# Patient Record
Sex: MALE | Race: BLACK OR AFRICAN AMERICAN | NOT HISPANIC OR LATINO | RURAL
[De-identification: names, ages, dates, MRNs, and addresses within clinical notes are randomized per-mention and may not be internally consistent; named-entity substitution may affect disease eponyms.]

---

## 2022-04-24 ENCOUNTER — HOSPITAL ENCOUNTER (EMERGENCY)
Facility: HOSPITAL | Age: 72
Discharge: HOME | End: 2022-04-24
Payer: MEDICARE

## 2022-04-24 VITALS
RESPIRATION RATE: 16 BRPM | OXYGEN SATURATION: 96 % | TEMPERATURE: 98 F | DIASTOLIC BLOOD PRESSURE: 109 MMHG | HEART RATE: 69 BPM | SYSTOLIC BLOOD PRESSURE: 181 MMHG | HEIGHT: 74 IN | WEIGHT: 200 LBS | BODY MASS INDEX: 25.67 KG/M2

## 2022-04-24 DIAGNOSIS — T78.40XA ALLERGIC REACTION, INITIAL ENCOUNTER: Primary | ICD-10-CM

## 2022-04-24 DIAGNOSIS — I10 HYPERTENSION, UNSPECIFIED TYPE: ICD-10-CM

## 2022-04-24 PROCEDURE — 63600175 PHARM REV CODE 636 W HCPCS: Performed by: NURSE PRACTITIONER

## 2022-04-24 PROCEDURE — 96375 TX/PRO/DX INJ NEW DRUG ADDON: CPT

## 2022-04-24 PROCEDURE — 99284 EMERGENCY DEPT VISIT MOD MDM: CPT | Mod: 25

## 2022-04-24 PROCEDURE — 99283 EMERGENCY DEPT VISIT LOW MDM: CPT | Mod: GF | Performed by: NURSE PRACTITIONER

## 2022-04-24 PROCEDURE — 96374 THER/PROPH/DIAG INJ IV PUSH: CPT

## 2022-04-24 RX ORDER — METHYLPREDNISOLONE SOD SUCC 125 MG
125 VIAL (EA) INJECTION
Status: COMPLETED | OUTPATIENT
Start: 2022-04-24 | End: 2022-04-24

## 2022-04-24 RX ORDER — DIPHENHYDRAMINE HYDROCHLORIDE 50 MG/ML
25 INJECTION INTRAMUSCULAR; INTRAVENOUS
Status: COMPLETED | OUTPATIENT
Start: 2022-04-24 | End: 2022-04-24

## 2022-04-24 RX ORDER — METHYLPREDNISOLONE 4 MG/1
TABLET ORAL
Qty: 1 EACH | Refills: 0 | Status: SHIPPED | OUTPATIENT
Start: 2022-04-24 | End: 2022-04-24 | Stop reason: SDUPTHER

## 2022-04-24 RX ORDER — METHYLPREDNISOLONE 4 MG/1
TABLET ORAL
Qty: 1 EACH | Refills: 0 | Status: SHIPPED | OUTPATIENT
Start: 2022-04-24 | End: 2022-05-15

## 2022-04-24 RX ADMIN — METHYLPREDNISOLONE SODIUM SUCCINATE 125 MG: 125 INJECTION, POWDER, FOR SOLUTION INTRAMUSCULAR; INTRAVENOUS at 12:04

## 2022-04-24 RX ADMIN — DIPHENHYDRAMINE HYDROCHLORIDE 25 MG: 50 INJECTION, SOLUTION INTRAMUSCULAR; INTRAVENOUS at 12:04

## 2022-04-24 NOTE — DISCHARGE INSTRUCTIONS
Monitor BP twice a day and take to the clinic next week. Take Benadryl at home for swelling. Return to the ED for any increase in symptoms or any worrisome of symptoms.

## 2022-04-27 ENCOUNTER — TELEPHONE (OUTPATIENT)
Dept: EMERGENCY MEDICINE | Facility: HOSPITAL | Age: 72
End: 2022-04-27
Payer: MEDICAID

## 2022-08-30 NOTE — ED PROVIDER NOTES
Encounter Date: 4/24/2022       History     Chief Complaint   Patient presents with    Oral Swelling     Upper lip swelling since this am      70 y/o BF presents to the ED with c/o swelling to his upper lip noted upon waking this morning. Pt reports he woke up around 730, and his lip was already swelling. He reports it has been this swollen for about 5 hours. He denies any difficulty swallowing or drinking. Reports he ate corn flakes this morning. He denies any chest pain or shortness of breath. Pt is currently hypertension        Review of patient's allergies indicates:  No Known Allergies  History reviewed. No pertinent past medical history.  History reviewed. No pertinent surgical history.  Family History   Problem Relation Age of Onset    Hypertension Mother     Hypertension Father     Hypertension Sister     Hypertension Brother      Social History     Tobacco Use    Smoking status: Current Every Day Smoker     Types: Cigarettes    Smokeless tobacco: Never Used   Substance Use Topics    Alcohol use: Yes     Comment: drinks 1 pint a day    Drug use: Yes     Frequency: 1.0 times per week     Types: Marijuana     Comment: every other week     Review of Systems   Constitutional: Negative.    HENT: Negative.         Swelling of upper lip     Eyes: Negative.    Respiratory: Negative.  Negative for shortness of breath.    Cardiovascular: Negative.  Negative for chest pain, palpitations and leg swelling.   Gastrointestinal: Negative.  Negative for diarrhea, nausea and vomiting.   Endocrine: Negative.    Genitourinary: Negative.    Musculoskeletal: Negative.  Negative for arthralgias, back pain and neck pain.   Skin: Negative.    Neurological: Negative.    Psychiatric/Behavioral: Negative.    All other systems reviewed and are negative.      Physical Exam     Initial Vitals [04/24/22 1225]   BP Pulse Resp Temp SpO2   (!) 202/113 72 18 98.1 °F (36.7 °C) 97 %      MAP       --         Physical Exam    Nursing  note and vitals reviewed.  Constitutional: He appears well-developed and well-nourished.   HENT:   Head: Normocephalic and atraumatic.   Mouth/Throat: Uvula is midline, oropharynx is clear and moist and mucous membranes are normal. No uvula swelling.       Eyes: Conjunctivae and EOM are normal. Pupils are equal, round, and reactive to light.   Neck: Neck supple.   Normal range of motion.  Cardiovascular: Normal rate, regular rhythm and normal heart sounds.   Pulmonary/Chest: Breath sounds normal.   Abdominal: Abdomen is soft. Bowel sounds are normal. There is no abdominal tenderness. There is no rebound and no guarding.   Musculoskeletal:         General: Normal range of motion.      Cervical back: Normal range of motion and neck supple.     Neurological: He is alert and oriented to person, place, and time. He has normal strength.   Skin: Skin is warm and dry.   Psychiatric: He has a normal mood and affect.         Medical Screening Exam   See Full Note    ED Course   Procedures  Labs Reviewed - No data to display       Imaging Results    None          Medications   diphenhydrAMINE injection 25 mg (25 mg Intravenous Given 4/24/22 1255)   methylPREDNISolone sodium succinate injection 125 mg (125 mg Intravenous Given 4/24/22 1255)     Medical Decision Making:   ED Management:  Swelling had decreased some. Medrol dosepack sent to [facility]. Pt is to take OTC Bendaryl and follow up with PCP or HTN.                   Clinical Impression:   Final diagnoses:  [T78.40XA] Allergic reaction, initial encounter (Primary)  [I10] Hypertension, unspecified type          ED Disposition Condition    Discharge Stable        ED Prescriptions     Medication Sig Dispense Start Date End Date Auth. Provider    methylPREDNISolone (MEDROL DOSEPACK) 4 mg tablet  (Status: Discontinued) Take as directed 1 each 4/24/2022 4/24/2022 LÓPEZ Broussard    methylPREDNISolone (MEDROL DOSEPACK) 4 mg tablet  (Status: Discontinued) Take as directed 1  each 4/24/2022 4/24/2022 LÓPEZ Broussard    methylPREDNISolone (MEDROL DOSEPACK) 4 mg tablet Take as directed 1 each 4/24/2022 5/15/2022 LÓPEZ Broussard        Follow-up Information    None          LÓPEZ Broussard  04/24/22 3173     yes

## 2025-02-05 ENCOUNTER — OFFICE VISIT (OUTPATIENT)
Dept: FAMILY MEDICINE | Facility: CLINIC | Age: 75
End: 2025-02-05
Payer: MEDICARE

## 2025-02-05 VITALS
DIASTOLIC BLOOD PRESSURE: 75 MMHG | HEIGHT: 74 IN | SYSTOLIC BLOOD PRESSURE: 168 MMHG | BODY MASS INDEX: 25.23 KG/M2 | TEMPERATURE: 98 F | OXYGEN SATURATION: 96 % | HEART RATE: 85 BPM | WEIGHT: 196.63 LBS | RESPIRATION RATE: 18 BRPM

## 2025-02-05 DIAGNOSIS — I10 ESSENTIAL HYPERTENSION: Primary | ICD-10-CM

## 2025-02-05 LAB
ALBUMIN SERPL BCP-MCNC: 3.7 G/DL (ref 3.4–4.8)
ALBUMIN/GLOB SERPL: 0.8 {RATIO}
ALP SERPL-CCNC: 54 U/L (ref 40–150)
ALT SERPL W P-5'-P-CCNC: 14 U/L
ANION GAP SERPL CALCULATED.3IONS-SCNC: 13 MMOL/L (ref 7–16)
AST SERPL W P-5'-P-CCNC: 26 U/L (ref 5–34)
BASOPHILS # BLD AUTO: 0.1 K/UL (ref 0–0.2)
BASOPHILS NFR BLD AUTO: 1.5 % (ref 0–1)
BILIRUB SERPL-MCNC: 0.4 MG/DL
BUN SERPL-MCNC: 21 MG/DL (ref 8–26)
BUN/CREAT SERPL: 18 (ref 6–20)
CALCIUM SERPL-MCNC: 9.6 MG/DL (ref 8.8–10)
CHLORIDE SERPL-SCNC: 108 MMOL/L (ref 98–107)
CHOLEST SERPL-MCNC: 168 MG/DL
CHOLEST/HDLC SERPL: 3.4 {RATIO}
CO2 SERPL-SCNC: 21 MMOL/L (ref 23–31)
CREAT SERPL-MCNC: 1.15 MG/DL (ref 0.72–1.25)
DIFFERENTIAL METHOD BLD: ABNORMAL
EGFR (NO RACE VARIABLE) (RUSH/TITUS): 67 ML/MIN/1.73M2
EOSINOPHIL # BLD AUTO: 0.41 K/UL (ref 0–0.5)
EOSINOPHIL NFR BLD AUTO: 6 % (ref 1–4)
ERYTHROCYTE [DISTWIDTH] IN BLOOD BY AUTOMATED COUNT: 14.4 % (ref 11.5–14.5)
GLOBULIN SER-MCNC: 4.9 G/DL (ref 2–4)
GLUCOSE SERPL-MCNC: 84 MG/DL (ref 82–115)
HCT VFR BLD AUTO: 41.1 % (ref 40–54)
HDLC SERPL-MCNC: 49 MG/DL (ref 35–60)
HGB BLD-MCNC: 13.4 G/DL (ref 13.5–18)
IMM GRANULOCYTES # BLD AUTO: 0.01 K/UL (ref 0–0.04)
IMM GRANULOCYTES NFR BLD: 0.1 % (ref 0–0.4)
LDLC SERPL CALC-MCNC: 107 MG/DL
LDLC/HDLC SERPL: 2.2 {RATIO}
LYMPHOCYTES # BLD AUTO: 2.1 K/UL (ref 1–4.8)
LYMPHOCYTES NFR BLD AUTO: 30.6 % (ref 27–41)
MCH RBC QN AUTO: 30.8 PG (ref 27–31)
MCHC RBC AUTO-ENTMCNC: 32.6 G/DL (ref 32–36)
MCV RBC AUTO: 94.5 FL (ref 80–96)
MONOCYTES # BLD AUTO: 0.58 K/UL (ref 0–0.8)
MONOCYTES NFR BLD AUTO: 8.5 % (ref 2–6)
MPC BLD CALC-MCNC: 10.3 FL (ref 9.4–12.4)
NEUTROPHILS # BLD AUTO: 3.66 K/UL (ref 1.8–7.7)
NEUTROPHILS NFR BLD AUTO: 53.3 % (ref 53–65)
NONHDLC SERPL-MCNC: 119 MG/DL
NRBC # BLD AUTO: 0 X10E3/UL
NRBC, AUTO (.00): 0 %
NT-PROBNP SERPL-MCNC: 175 PG/ML (ref 1–125)
PLATELET # BLD AUTO: 309 K/UL (ref 150–400)
POTASSIUM SERPL-SCNC: 3.8 MMOL/L (ref 3.5–5.1)
PROT SERPL-MCNC: 8.6 G/DL (ref 5.8–7.6)
RBC # BLD AUTO: 4.35 M/UL (ref 4.6–6.2)
SODIUM SERPL-SCNC: 138 MMOL/L (ref 136–145)
TRIGL SERPL-MCNC: 61 MG/DL (ref 34–140)
VLDLC SERPL-MCNC: 12 MG/DL
WBC # BLD AUTO: 6.86 K/UL (ref 4.5–11)

## 2025-02-05 PROCEDURE — 83880 ASSAY OF NATRIURETIC PEPTIDE: CPT | Mod: ,,, | Performed by: CLINICAL MEDICAL LABORATORY

## 2025-02-05 PROCEDURE — 80061 LIPID PANEL: CPT | Mod: ,,, | Performed by: CLINICAL MEDICAL LABORATORY

## 2025-02-05 PROCEDURE — 82570 ASSAY OF URINE CREATININE: CPT | Mod: ,,, | Performed by: CLINICAL MEDICAL LABORATORY

## 2025-02-05 PROCEDURE — 85025 COMPLETE CBC W/AUTO DIFF WBC: CPT | Mod: ,,, | Performed by: CLINICAL MEDICAL LABORATORY

## 2025-02-05 PROCEDURE — 99204 OFFICE O/P NEW MOD 45 MIN: CPT | Mod: ,,, | Performed by: FAMILY MEDICINE

## 2025-02-05 PROCEDURE — 80053 COMPREHEN METABOLIC PANEL: CPT | Mod: ,,, | Performed by: CLINICAL MEDICAL LABORATORY

## 2025-02-05 PROCEDURE — 82043 UR ALBUMIN QUANTITATIVE: CPT | Mod: ,,, | Performed by: CLINICAL MEDICAL LABORATORY

## 2025-02-05 RX ORDER — HYDRALAZINE HYDROCHLORIDE 25 MG/1
25 TABLET, FILM COATED ORAL EVERY 8 HOURS
COMMUNITY
Start: 2025-01-28

## 2025-02-05 RX ORDER — AMLODIPINE BESYLATE 10 MG/1
10 TABLET ORAL DAILY
Qty: 90 TABLET | Refills: 3 | Status: SHIPPED | OUTPATIENT
Start: 2025-02-05 | End: 2026-02-05

## 2025-02-05 RX ORDER — AMLODIPINE BESYLATE 5 MG/1
5 TABLET ORAL DAILY
COMMUNITY
Start: 2025-01-28 | End: 2025-02-05 | Stop reason: DRUGHIGH

## 2025-02-05 NOTE — PROGRESS NOTES
"Clinic Note    Patient Name: Wally Lee  : 1950  MRN: [id number]    Chief Complaint   Patient presents with    Hypertension     Patient presents on today for follow up on hospital visit for b/p       HPI:    Mr. Wally Lee is a 74 y.o. male who presents to clinic today with CC of follow up on chronic disease processes including HTN.  Reports he went to the ER last week after he woke up and did not feel well. Reports he was admitted to [facility] for what sounds like hypertensive urgency. I do not have records. He reports "everything was fine" except his blood pressure was high. Reports he had scans and lab work during this hospitalization.  Reports that he was started on amlodipine and hydralazine. BP is elevated today. Patient reports medication compliance.   Denies problems or side effects with medications.  Patient is, otherwise, without complaints.     Medications:  Medication List with Changes/Refills   New Medications    AMLODIPINE (NORVASC) 10 MG TABLET    Take 1 tablet (10 mg total) by mouth once daily.   Current Medications    HYDRALAZINE (APRESOLINE) 25 MG TABLET    Take 25 mg by mouth every 8 (eight) hours.   Discontinued Medications    AMLODIPINE (NORVASC) 5 MG TABLET    Take 5 mg by mouth once daily.        Allergies: Patient has no known allergies.      Past Medical History:    History reviewed. No pertinent past medical history.    Past Surgical History:    History reviewed. No pertinent surgical history.      Social History:    Social History     Tobacco Use   Smoking Status Every Day    Types: Cigarettes   Smokeless Tobacco Never     Social History     Substance and Sexual Activity   Alcohol Use Yes    Comment: drinks 1 pint a day     Social History     Substance and Sexual Activity   Drug Use Yes    Frequency: 1.0 times per week    Types: Marijuana    Comment: every other week         Family History:    Family History   Problem Relation Name Age of Onset    Hypertension Mother " "     Hypertension Father      Hypertension Sister      Hypertension Brother         Review of Systems:    Review of Systems   Constitutional:  Negative for appetite change, chills, fatigue, fever and unexpected weight change.   Eyes:  Negative for visual disturbance.   Respiratory:  Negative for cough and shortness of breath.         Denies SOB currently but reports SOB with exertion for the past 4-6 weeks. Believes he saw Cardiology while he was in the hospital.   Cardiovascular:  Negative for chest pain and leg swelling.   Gastrointestinal:  Negative for abdominal pain, change in bowel habit, constipation, diarrhea, nausea and vomiting.   Musculoskeletal:  Negative for arthralgias.   Integumentary:  Negative for rash.   Neurological:  Negative for dizziness and headaches.   Psychiatric/Behavioral:  The patient is not nervous/anxious.         Vitals:    Vitals:    02/05/25 0842 02/05/25 0926   BP: (!) 196/94 (!) 168/75   BP Location: Left arm Left arm   Patient Position: Sitting Sitting   Pulse: 85    Resp: 18    Temp: 98 °F (36.7 °C)    TempSrc: Oral    SpO2: 96%    Weight: 89.2 kg (196 lb 9.6 oz)    Height: 6' 2" (1.88 m)        Body mass index is 25.24 kg/m².    Wt Readings from Last 3 Encounters:   02/05/25 0842 89.2 kg (196 lb 9.6 oz)   04/24/22 1225 90.7 kg (200 lb)        Physical Exam:    Physical Exam  Constitutional:       General: He is not in acute distress.     Appearance: Normal appearance.   HENT:      Nose: Nose normal.      Mouth/Throat:      Mouth: Mucous membranes are moist.      Pharynx: Oropharynx is clear.   Eyes:      Conjunctiva/sclera: Conjunctivae normal.   Cardiovascular:      Rate and Rhythm: Normal rate and regular rhythm.      Heart sounds: Normal heart sounds. No murmur heard.  Pulmonary:      Effort: Pulmonary effort is normal. No respiratory distress.      Breath sounds: Normal breath sounds. No wheezing, rhonchi or rales.   Abdominal:      General: Bowel sounds are normal.      " Palpations: Abdomen is soft.      Tenderness: There is no abdominal tenderness.   Musculoskeletal:      Cervical back: Neck supple.      Right lower leg: No edema.      Left lower leg: No edema.   Skin:     Findings: No rash.   Neurological:      General: No focal deficit present.      Mental Status: He is alert. Mental status is at baseline.   Psychiatric:         Mood and Affect: Mood normal.         Assessment/Plan:   1. Essential hypertension  -     CBC Auto Differential; Future; Expected date: 02/05/2025  -     Comprehensive Metabolic Panel; Future; Expected date: 02/05/2025  -     Lipid Panel; Future; Expected date: 02/05/2025  -     Microalbumin/Creatinine Ratio, Urine  -     NT-Pro Natriuretic Peptide; Future; Expected date: 02/05/2025  -     amLODIPine (NORVASC) 10 MG tablet; Take 1 tablet (10 mg total) by mouth once daily.  Dispense: 90 tablet; Refill: 3 - dose increase  - Continue hydralazine.  - DASH diet and exercise  - Asymptomatic from elevated readings  - Records requested from [facility] from recent hospitalization.  - RTC in 2 weeks for follow up on HTN medication changes.         Active Problem List with Overview Notes    Diagnosis Date Noted    Essential hypertension 02/05/2025        Health Maintenance:  Health Maintenance   Topic Date Due    Hepatitis C Screening  Never done    Lipid Panel  Never done    TETANUS VACCINE  Never done    Pneumococcal Vaccines (Age 50+) (1 of 2 - PCV) Never done    Colorectal Cancer Screening  Never done    Shingles Vaccine (1 of 2) Never done    RSV Vaccine (Age 60+ and Pregnant patients) (1 - Risk 60-74 years 1-dose series) Never done    Abdominal Aortic Aneurysm Screening  Never done    Influenza Vaccine (1) Never done    COVID-19 Vaccine (5 - 2024-25 season) 09/01/2024       RTC in 2 weeks for follow up on HTN medication changes.  RTC sooner if needed.   Patient voiced understanding and is agreeable to plan.      Naima Gallagher MD    Family  Medicine

## 2025-02-06 LAB
CREAT UR-MCNC: 206 MG/DL (ref 23–375)
MICROALBUMIN UR-MCNC: 25.6 MG/DL
MICROALBUMIN/CREAT RATIO PNL UR: 124.3 MG/G (ref 0–30)

## 2025-02-11 NOTE — PROGRESS NOTES
Attempted to call patient. The number is not in service. Patient has not alternate contact. Letter mailed to patient asking him to call us. Dr. Gallagher notified.

## 2025-02-26 ENCOUNTER — OFFICE VISIT (OUTPATIENT)
Dept: FAMILY MEDICINE | Facility: CLINIC | Age: 75
End: 2025-02-26
Payer: MEDICARE

## 2025-02-26 VITALS
TEMPERATURE: 98 F | HEART RATE: 83 BPM | WEIGHT: 203 LBS | RESPIRATION RATE: 18 BRPM | OXYGEN SATURATION: 97 % | BODY MASS INDEX: 26.05 KG/M2 | DIASTOLIC BLOOD PRESSURE: 82 MMHG | HEIGHT: 74 IN | SYSTOLIC BLOOD PRESSURE: 162 MMHG

## 2025-02-26 DIAGNOSIS — R06.09 DYSPNEA ON EXERTION: ICD-10-CM

## 2025-02-26 DIAGNOSIS — I10 ESSENTIAL HYPERTENSION: Primary | ICD-10-CM

## 2025-02-26 PROCEDURE — 3008F BODY MASS INDEX DOCD: CPT | Mod: ,,, | Performed by: FAMILY MEDICINE

## 2025-02-26 PROCEDURE — 3066F NEPHROPATHY DOC TX: CPT | Mod: ,,, | Performed by: FAMILY MEDICINE

## 2025-02-26 PROCEDURE — 4010F ACE/ARB THERAPY RXD/TAKEN: CPT | Mod: ,,, | Performed by: FAMILY MEDICINE

## 2025-02-26 PROCEDURE — 99213 OFFICE O/P EST LOW 20 MIN: CPT | Mod: ,,, | Performed by: FAMILY MEDICINE

## 2025-02-26 PROCEDURE — 3288F FALL RISK ASSESSMENT DOCD: CPT | Mod: ,,, | Performed by: FAMILY MEDICINE

## 2025-02-26 PROCEDURE — 1101F PT FALLS ASSESS-DOCD LE1/YR: CPT | Mod: ,,, | Performed by: FAMILY MEDICINE

## 2025-02-26 PROCEDURE — 3079F DIAST BP 80-89 MM HG: CPT | Mod: ,,, | Performed by: FAMILY MEDICINE

## 2025-02-26 PROCEDURE — 3060F POS MICROALBUMINURIA REV: CPT | Mod: ,,, | Performed by: FAMILY MEDICINE

## 2025-02-26 PROCEDURE — 3077F SYST BP >= 140 MM HG: CPT | Mod: ,,, | Performed by: FAMILY MEDICINE

## 2025-02-26 PROCEDURE — 1159F MED LIST DOCD IN RCRD: CPT | Mod: ,,, | Performed by: FAMILY MEDICINE

## 2025-02-26 RX ORDER — LOSARTAN POTASSIUM 25 MG/1
25 TABLET ORAL DAILY
Qty: 30 TABLET | Refills: 1 | Status: SHIPPED | OUTPATIENT
Start: 2025-02-26 | End: 2026-02-26

## 2025-02-26 NOTE — PROGRESS NOTES
Clinic Note    Patient Name: Wally Lee  : 1950  MRN: [id number]    Chief Complaint   Patient presents with    Follow-up     2 week f/u       HPI:    Mr. Wally Lee is a 74 y.o. male who presents to clinic today with CC of follow up on chronic disease processes including HTN.  BP was elevated at previous visit. Amlodipine dose was increased and he was advised to continue hydralazine as was previously prescribed from a recent hospitalization. BP is elevated today. Patient reports  medication compliance but admits he does occasionally forget noon dose of hydralazine. He is asymptomatic from elevated blood pressure readings.   ProBNP was mildly elevated on recent labs. Patient did admit to SOB with exertion at last office visit. He was previously hospital for what he describes as HTN urgency. However, it is unclear what time of cardiac evaluation he had during this hospitalization. It was at an outside hospital. After review of his last set up labs I recommended referral to Cardiology but patient's phone number had been disconnected and letter was mailed. It does not appear he called back to further discuss this issue. Today, patient reports he is agreeable to referral to Cardiology for further evaluation. Reports he got a new phone this week. States he has updated his number at the .   Patient reports chronic issues are, otherwise, well controlled on current medication regimen.  Denies problems or side effects with medications.  Patient is, otherwise, without complaints.     Medications:  Medication List with Changes/Refills   New Medications    LOSARTAN (COZAAR) 25 MG TABLET    Take 1 tablet (25 mg total) by mouth once daily.   Current Medications    AMLODIPINE (NORVASC) 10 MG TABLET    Take 1 tablet (10 mg total) by mouth once daily.    HYDRALAZINE (APRESOLINE) 25 MG TABLET    Take 25 mg by mouth every 8 (eight) hours.        Allergies: Patient has no known allergies.      Past Medical  "History:    History reviewed. No pertinent past medical history.    Past Surgical History:    History reviewed. No pertinent surgical history.      Social History:    Tobacco Use History[1]  Social History     Substance and Sexual Activity   Alcohol Use Yes    Comment: drinks 1 pint a day     Social History     Substance and Sexual Activity   Drug Use Yes    Frequency: 1.0 times per week    Types: Marijuana    Comment: every other week         Family History:    Family History   Problem Relation Name Age of Onset    Hypertension Mother      Hypertension Father      Hypertension Sister      Hypertension Brother         Review of Systems:    Review of Systems   Constitutional:  Negative for appetite change, chills, fatigue, fever and unexpected weight change.   Eyes:  Negative for visual disturbance.   Respiratory:  Negative for cough.         Reports SOB with exertion   Cardiovascular:  Negative for chest pain and leg swelling.   Gastrointestinal:  Negative for abdominal pain, change in bowel habit, constipation, diarrhea, nausea and vomiting.   Musculoskeletal:  Negative for arthralgias.   Integumentary:  Negative for rash.   Neurological:  Negative for dizziness and headaches.   Psychiatric/Behavioral:  The patient is not nervous/anxious.       Vitals:    Vitals:    02/26/25 1427 02/26/25 1459   BP: (!) 194/100 (!) 162/82   BP Location: Left arm Left arm   Patient Position: Sitting Sitting   Pulse: 83    Resp: 18    Temp: 98.2 °F (36.8 °C)    TempSrc: Oral    SpO2: 97%    Weight: 92.1 kg (203 lb)    Height: 6' 2" (1.88 m)        Body mass index is 26.06 kg/m².    Wt Readings from Last 3 Encounters:   02/26/25 1427 92.1 kg (203 lb)   02/05/25 0842 89.2 kg (196 lb 9.6 oz)   04/24/22 1225 90.7 kg (200 lb)        Physical Exam:    Physical Exam  Constitutional:       General: He is not in acute distress.     Appearance: Normal appearance.   HENT:      Nose: Nose normal.      Mouth/Throat:      Mouth: Mucous membranes " are moist.      Pharynx: Oropharynx is clear.   Eyes:      Conjunctiva/sclera: Conjunctivae normal.   Cardiovascular:      Rate and Rhythm: Normal rate and regular rhythm.      Heart sounds: Normal heart sounds. No murmur heard.  Pulmonary:      Effort: Pulmonary effort is normal. No respiratory distress.      Breath sounds: Normal breath sounds. No wheezing, rhonchi or rales.   Abdominal:      General: Bowel sounds are normal.      Palpations: Abdomen is soft.      Tenderness: There is no abdominal tenderness.   Musculoskeletal:      Cervical back: Neck supple.      Right lower leg: No edema.      Left lower leg: No edema.   Skin:     Findings: No rash.   Neurological:      General: No focal deficit present.      Mental Status: He is alert. Mental status is at baseline.   Psychiatric:         Mood and Affect: Mood normal.       Assessment/Plan:   1. Essential hypertension  -     losartan (COZAAR) 25 MG tablet; Take 1 tablet (25 mg total) by mouth once daily.  Dispense: 30 tablet; Refill: 1- new medication. Risks/benefits/potential side effects/black box warning reviewed and discussed with patient.     2. Dyspnea on exertion  -     Ambulatory referral/consult to Cardiology; Future; Expected date: 03/05/2025         Active Problem List with Overview Notes    Diagnosis Date Noted    Essential hypertension 02/05/2025        Health Maintenance:  Health Maintenance   Topic Date Due    Hepatitis C Screening  Never done    TETANUS VACCINE  Never done    Pneumococcal Vaccines (Age 50+) (1 of 2 - PCV) Never done    Colorectal Cancer Screening  Never done    Shingles Vaccine (1 of 2) Never done    RSV Vaccine (Age 60+ and Pregnant patients) (1 - Risk 60-74 years 1-dose series) Never done    Abdominal Aortic Aneurysm Screening  Never done    Influenza Vaccine (1) Never done    COVID-19 Vaccine (5 - 2024-25 season) 09/01/2024    Lipid Panel  02/05/2030       RTC in 2-4 weeks for follow up on HTN medication changes.   RTC sooner  if needed.   Patient voiced understanding and is agreeable to plan.      Naima Gallagher MD    Family Medicine           [1]   Social History  Tobacco Use   Smoking Status Every Day    Types: Cigarettes   Smokeless Tobacco Never

## 2025-03-03 DIAGNOSIS — I10 ESSENTIAL HYPERTENSION: Primary | ICD-10-CM

## 2025-03-26 ENCOUNTER — OFFICE VISIT (OUTPATIENT)
Dept: CARDIOLOGY | Facility: CLINIC | Age: 75
End: 2025-03-26
Payer: MEDICARE

## 2025-03-26 VITALS
RESPIRATION RATE: 18 BRPM | WEIGHT: 200 LBS | BODY MASS INDEX: 27.09 KG/M2 | DIASTOLIC BLOOD PRESSURE: 90 MMHG | HEART RATE: 73 BPM | SYSTOLIC BLOOD PRESSURE: 178 MMHG | HEIGHT: 72 IN

## 2025-03-26 DIAGNOSIS — I10 ESSENTIAL HYPERTENSION: ICD-10-CM

## 2025-03-26 DIAGNOSIS — R06.09 DYSPNEA ON EXERTION: ICD-10-CM

## 2025-03-26 DIAGNOSIS — I10 ESSENTIAL HYPERTENSION: Primary | ICD-10-CM

## 2025-03-26 PROCEDURE — 3008F BODY MASS INDEX DOCD: CPT | Mod: CPTII,,, | Performed by: STUDENT IN AN ORGANIZED HEALTH CARE EDUCATION/TRAINING PROGRAM

## 2025-03-26 PROCEDURE — 3077F SYST BP >= 140 MM HG: CPT | Mod: CPTII,,, | Performed by: STUDENT IN AN ORGANIZED HEALTH CARE EDUCATION/TRAINING PROGRAM

## 2025-03-26 PROCEDURE — 99999 PR PBB SHADOW E&M-EST. PATIENT-LVL IV: CPT | Mod: PBBFAC,,, | Performed by: STUDENT IN AN ORGANIZED HEALTH CARE EDUCATION/TRAINING PROGRAM

## 2025-03-26 PROCEDURE — 4010F ACE/ARB THERAPY RXD/TAKEN: CPT | Mod: CPTII,,, | Performed by: STUDENT IN AN ORGANIZED HEALTH CARE EDUCATION/TRAINING PROGRAM

## 2025-03-26 PROCEDURE — 99214 OFFICE O/P EST MOD 30 MIN: CPT | Mod: PBBFAC | Performed by: STUDENT IN AN ORGANIZED HEALTH CARE EDUCATION/TRAINING PROGRAM

## 2025-03-26 PROCEDURE — 3066F NEPHROPATHY DOC TX: CPT | Mod: CPTII,,, | Performed by: STUDENT IN AN ORGANIZED HEALTH CARE EDUCATION/TRAINING PROGRAM

## 2025-03-26 PROCEDURE — 3060F POS MICROALBUMINURIA REV: CPT | Mod: CPTII,,, | Performed by: STUDENT IN AN ORGANIZED HEALTH CARE EDUCATION/TRAINING PROGRAM

## 2025-03-26 PROCEDURE — 99203 OFFICE O/P NEW LOW 30 MIN: CPT | Mod: S$PBB,,, | Performed by: STUDENT IN AN ORGANIZED HEALTH CARE EDUCATION/TRAINING PROGRAM

## 2025-03-26 PROCEDURE — 1159F MED LIST DOCD IN RCRD: CPT | Mod: CPTII,,, | Performed by: STUDENT IN AN ORGANIZED HEALTH CARE EDUCATION/TRAINING PROGRAM

## 2025-03-26 PROCEDURE — 3080F DIAST BP >= 90 MM HG: CPT | Mod: CPTII,,, | Performed by: STUDENT IN AN ORGANIZED HEALTH CARE EDUCATION/TRAINING PROGRAM

## 2025-03-26 RX ORDER — HYDRALAZINE HYDROCHLORIDE 25 MG/1
25 TABLET, FILM COATED ORAL EVERY 8 HOURS
Qty: 270 TABLET | Refills: 3 | Status: SHIPPED | OUTPATIENT
Start: 2025-03-26 | End: 2026-03-26

## 2025-03-26 RX ORDER — AMLODIPINE BESYLATE 10 MG/1
10 TABLET ORAL DAILY
Qty: 90 TABLET | Refills: 3 | Status: SHIPPED | OUTPATIENT
Start: 2025-03-26 | End: 2026-03-26

## 2025-03-26 NOTE — PROGRESS NOTES
PCP: Pati, Primary Doctor    Referring Provider:     Subjective:   Wally Lee is a 74 y.o. male with hx of HTN, who presents for evaluation of HTN.     Patient with elevated BP today in clinic. Report dizziness with losartan. Out of amlodipine for last 2 days.     Patient has poor health literacy.     Fhx: non-contributary  Shx: occasional smoker, denies etoh or drug use    EKG - 3/26/25 - NSR, LAD, LVH       Lab Results   Component Value Date     02/05/2025    K 3.8 02/05/2025     (H) 02/05/2025    CO2 21 (L) 02/05/2025    BUN 21 02/05/2025    CREATININE 1.15 02/05/2025    CALCIUM 9.6 02/05/2025    ANIONGAP 13 02/05/2025       Lab Results   Component Value Date    CHOL 168 02/05/2025     Lab Results   Component Value Date    HDL 49 02/05/2025     Lab Results   Component Value Date    LDLCALC 107 02/05/2025     Lab Results   Component Value Date    TRIG 61 02/05/2025     Lab Results   Component Value Date    CHOLHDL 3.4 02/05/2025       Lab Results   Component Value Date    WBC 6.86 02/05/2025    HGB 13.4 (L) 02/05/2025    HCT 41.1 02/05/2025    MCV 94.5 02/05/2025     02/05/2025         Current Medications[1]    Review of Systems   Respiratory:  Negative for cough and shortness of breath.    Cardiovascular:  Negative for chest pain, palpitations, orthopnea, claudication, leg swelling and PND.         Objective:   BP (!) 178/90   Pulse 73   Resp 18   Ht 6' (1.829 m)   Wt 90.7 kg (200 lb)   BMI 27.12 kg/m²     Physical Exam  Vitals and nursing note reviewed.   Constitutional:       Appearance: Normal appearance.   Cardiovascular:      Rate and Rhythm: Normal rate and regular rhythm.      Pulses: Normal pulses.      Heart sounds: Normal heart sounds.   Pulmonary:      Breath sounds: Normal breath sounds.   Neurological:      Mental Status: He is alert and oriented to person, place, and time.           Assessment:     1. Dyspnea on exertion  Ambulatory referral/consult to Cardiology      2.  Essential hypertension  amLODIPine (NORVASC) 10 MG tablet    Echo            Plan:   Essential hypertension  Uncontrolled  Poor health literacy  Stop losartan; restart amlodipine and continue hydralazine   Consider switching hydralazine to chlorthalidone next visit - Unclear if patient   ECHO  Follow up with NP in 4 weeks for medication check               [1]   Current Outpatient Medications:     amLODIPine (NORVASC) 10 MG tablet, Take 1 tablet (10 mg total) by mouth once daily., Disp: 90 tablet, Rfl: 3    hydrALAZINE (APRESOLINE) 25 MG tablet, Take 1 tablet (25 mg total) by mouth every 8 (eight) hours., Disp: 270 tablet, Rfl: 3

## 2025-03-26 NOTE — ASSESSMENT & PLAN NOTE
Uncontrolled  Poor health literacy  Stop losartan; restart amlodipine and continue hydralazine   Consider switching hydralazine to chlorthalidone next visit - Unclear if patient   ECHO  Follow up with NP in 4 weeks for medication check

## 2025-03-31 ENCOUNTER — OFFICE VISIT (OUTPATIENT)
Dept: FAMILY MEDICINE | Facility: CLINIC | Age: 75
End: 2025-03-31
Payer: MEDICARE

## 2025-03-31 VITALS
SYSTOLIC BLOOD PRESSURE: 172 MMHG | BODY MASS INDEX: 28.04 KG/M2 | TEMPERATURE: 98 F | HEART RATE: 74 BPM | OXYGEN SATURATION: 96 % | RESPIRATION RATE: 18 BRPM | HEIGHT: 72 IN | WEIGHT: 207 LBS | DIASTOLIC BLOOD PRESSURE: 84 MMHG

## 2025-03-31 DIAGNOSIS — I10 ESSENTIAL HYPERTENSION: Primary | ICD-10-CM

## 2025-03-31 PROCEDURE — 4010F ACE/ARB THERAPY RXD/TAKEN: CPT | Mod: ,,, | Performed by: FAMILY MEDICINE

## 2025-03-31 PROCEDURE — 3060F POS MICROALBUMINURIA REV: CPT | Mod: ,,, | Performed by: FAMILY MEDICINE

## 2025-03-31 PROCEDURE — 3008F BODY MASS INDEX DOCD: CPT | Mod: ,,, | Performed by: FAMILY MEDICINE

## 2025-03-31 PROCEDURE — 1159F MED LIST DOCD IN RCRD: CPT | Mod: ,,, | Performed by: FAMILY MEDICINE

## 2025-03-31 PROCEDURE — 1101F PT FALLS ASSESS-DOCD LE1/YR: CPT | Mod: ,,, | Performed by: FAMILY MEDICINE

## 2025-03-31 PROCEDURE — 3288F FALL RISK ASSESSMENT DOCD: CPT | Mod: ,,, | Performed by: FAMILY MEDICINE

## 2025-03-31 PROCEDURE — 3079F DIAST BP 80-89 MM HG: CPT | Mod: ,,, | Performed by: FAMILY MEDICINE

## 2025-03-31 PROCEDURE — 3066F NEPHROPATHY DOC TX: CPT | Mod: ,,, | Performed by: FAMILY MEDICINE

## 2025-03-31 PROCEDURE — 3077F SYST BP >= 140 MM HG: CPT | Mod: ,,, | Performed by: FAMILY MEDICINE

## 2025-03-31 PROCEDURE — 99213 OFFICE O/P EST LOW 20 MIN: CPT | Mod: ,,, | Performed by: FAMILY MEDICINE

## 2025-03-31 RX ORDER — HYDRALAZINE HYDROCHLORIDE 50 MG/1
50 TABLET, FILM COATED ORAL 3 TIMES DAILY
Qty: 270 TABLET | Refills: 1 | Status: SHIPPED | OUTPATIENT
Start: 2025-03-31 | End: 2026-03-31

## 2025-03-31 NOTE — PROGRESS NOTES
Clinic Note    Patient Name: Wally Lee  : 1950  MRN: [id number]    Chief Complaint   Patient presents with    Follow-up     3 month f/u, patent presents to the clinic on today states that the losartan dosage is to much for him, pt states that it makes him dizzy.        HPI:    Mr. Wally Lee is a 74 y.o. male who presents to clinic today with CC of follow up on chronic disease processes including HTN.  BP was elevated at previous visit. Losartan was added to medication regimen.   Patient saw Cardiology (Dr. Vaughan) last week. BP was elevated at that time. Patient reported losartan caused dizziness and he had been out of his amlodipine X 2 days. Dr. Vaughan discontinued losartan. He refilled amlodipine and advised him to continue hydralazine. He ordered an echo and recommended Cardiology follow up in one month. Patient is scheduled for echo on 25.   BP is elevated today. Patient reports medication compliance.   Patient reports dizziness resolved when he stopped the losartan.   Patient reports chronic issues are, otherwise,  well controlled on current medication regimen.  Denies problems or side effects with medications.  Patient is, otherwise, without complaints.     Medications:  Medication List with Changes/Refills   New Medications    HYDRALAZINE (APRESOLINE) 50 MG TABLET    Take 1 tablet (50 mg total) by mouth 3 (three) times daily.   Current Medications    AMLODIPINE (NORVASC) 10 MG TABLET    Take 1 tablet (10 mg total) by mouth once daily.   Discontinued Medications    HYDRALAZINE (APRESOLINE) 25 MG TABLET    Take 1 tablet (25 mg total) by mouth every 8 (eight) hours.        Allergies: Patient has no known allergies.      Past Medical History:    History reviewed. No pertinent past medical history.    Past Surgical History:    History reviewed. No pertinent surgical history.      Social History:    Tobacco Use History[1]  Social History     Substance and Sexual Activity   Alcohol  Use Yes    Comment: drinks 1 pint a day     Social History     Substance and Sexual Activity   Drug Use Yes    Frequency: 1.0 times per week    Types: Marijuana    Comment: every other week         Family History:    Family History   Problem Relation Name Age of Onset    Hypertension Mother      Hypertension Father      Hypertension Sister      Hypertension Brother         Review of Systems:    Review of Systems   Constitutional:  Negative for appetite change, chills, fatigue, fever and unexpected weight change.   Eyes:  Negative for visual disturbance.   Respiratory:  Positive for shortness of breath. Negative for cough.         Reports intermittent issues with SOB - following with Dr. Vaughan (Cardiology) - scheduled for echo next month   Cardiovascular:  Negative for chest pain and leg swelling.        Reports occasional swelling in ankles that resolve with elevation overnight   Gastrointestinal:  Negative for abdominal pain, change in bowel habit, constipation, diarrhea, nausea and vomiting.   Musculoskeletal:  Negative for arthralgias.   Integumentary:  Negative for rash.   Neurological:  Negative for dizziness and headaches.   Psychiatric/Behavioral:  The patient is not nervous/anxious.         Vitals:    Vitals:    03/31/25 1423 03/31/25 1454   BP: (!) 179/87 (!) 172/84   BP Location: Left arm Left arm   Patient Position: Sitting Sitting   Pulse: 74    Resp: 18    Temp: 98.4 °F (36.9 °C)    TempSrc: Oral    SpO2: 96%    Weight: 93.9 kg (207 lb)    Height: 6' (1.829 m)        Body mass index is 28.07 kg/m².    Wt Readings from Last 3 Encounters:   03/31/25 1423 93.9 kg (207 lb)   03/26/25 1408 90.7 kg (200 lb)   02/26/25 1427 92.1 kg (203 lb)        Physical Exam:    Physical Exam  Constitutional:       General: He is not in acute distress.     Appearance: Normal appearance.   HENT:      Nose: Nose normal.      Mouth/Throat:      Mouth: Mucous membranes are moist.      Pharynx: Oropharynx is clear.   Eyes:       Conjunctiva/sclera: Conjunctivae normal.   Cardiovascular:      Rate and Rhythm: Normal rate and regular rhythm.      Heart sounds: Normal heart sounds. No murmur heard.  Pulmonary:      Effort: Pulmonary effort is normal. No respiratory distress.      Breath sounds: Normal breath sounds. No wheezing, rhonchi or rales.   Abdominal:      General: Bowel sounds are normal.      Palpations: Abdomen is soft.      Tenderness: There is no abdominal tenderness.   Musculoskeletal:      Cervical back: Neck supple.      Right lower leg: No edema.      Left lower leg: No edema.   Skin:     Findings: No rash.   Neurological:      General: No focal deficit present.      Mental Status: He is alert. Mental status is at baseline.   Psychiatric:         Mood and Affect: Mood normal.       Assessment/Plan:   1. Essential hypertension  -     hydrALAZINE (APRESOLINE) 50 MG tablet; Take 1 tablet (50 mg total) by mouth 3 (three) times daily.  Dispense: 270 tablet; Refill: 1 - dose increase         Active Problem List with Overview Notes    Diagnosis Date Noted    Essential hypertension 02/05/2025        Health Maintenance:  Health Maintenance   Topic Date Due    Hepatitis C Screening  Never done    TETANUS VACCINE  Never done    Pneumococcal Vaccines (Age 50+) (1 of 2 - PCV) Never done    Colorectal Cancer Screening  Never done    Shingles Vaccine (1 of 2) Never done    RSV Vaccine (Age 60+ and Pregnant patients) (1 - Risk 60-74 years 1-dose series) Never done    Abdominal Aortic Aneurysm Screening  Never done    Influenza Vaccine (1) Never done    COVID-19 Vaccine (5 - 2024-25 season) 09/01/2024    Lipid Panel  02/05/2030       RTC in 2-4 weeks for follow up on HTN medication changes.   RTC sooner if needed.   Patient voiced understanding and is agreeable to plan.      Naima Gallagher MD    Family Medicine           [1]   Social History  Tobacco Use   Smoking Status Every Day    Types: Cigarettes   Smokeless Tobacco Never

## 2025-04-28 ENCOUNTER — TELEPHONE (OUTPATIENT)
Dept: CARDIOLOGY | Facility: CLINIC | Age: 75
End: 2025-04-28
Payer: MEDICARE

## 2025-04-28 NOTE — TELEPHONE ENCOUNTER
-Pt. Notified echo r/s for 5-16-25@ 12:00 pt. Voiced understanding.---- Message from Elsie sent at 4/28/2025  2:51 PM CDT -----  Regarding: APPOINTMENT  Who Called: Wally Fairbanks Left Message for Patient:Does the patient know what this is regarding?:YESPreferred Method of Contact: Phone CallPatient's Preferred Phone Number on File: [phone] Best Call Back Number, if different: [phone] Information: NEEDS TO RESCHEDULE THE ECHO HE MISSED ON APRIL 16.

## 2025-05-05 ENCOUNTER — OFFICE VISIT (OUTPATIENT)
Dept: FAMILY MEDICINE | Facility: CLINIC | Age: 75
End: 2025-05-05
Payer: MEDICARE

## 2025-05-05 VITALS
OXYGEN SATURATION: 96 % | BODY MASS INDEX: 27.36 KG/M2 | RESPIRATION RATE: 18 BRPM | TEMPERATURE: 98 F | HEART RATE: 79 BPM | SYSTOLIC BLOOD PRESSURE: 178 MMHG | DIASTOLIC BLOOD PRESSURE: 82 MMHG | HEIGHT: 72 IN | WEIGHT: 202 LBS

## 2025-05-05 DIAGNOSIS — I10 ESSENTIAL HYPERTENSION: Primary | ICD-10-CM

## 2025-05-05 PROCEDURE — 4010F ACE/ARB THERAPY RXD/TAKEN: CPT | Mod: ,,, | Performed by: FAMILY MEDICINE

## 2025-05-05 PROCEDURE — 3077F SYST BP >= 140 MM HG: CPT | Mod: ,,, | Performed by: FAMILY MEDICINE

## 2025-05-05 PROCEDURE — 99213 OFFICE O/P EST LOW 20 MIN: CPT | Mod: ,,, | Performed by: FAMILY MEDICINE

## 2025-05-05 PROCEDURE — 3008F BODY MASS INDEX DOCD: CPT | Mod: ,,, | Performed by: FAMILY MEDICINE

## 2025-05-05 PROCEDURE — 3060F POS MICROALBUMINURIA REV: CPT | Mod: ,,, | Performed by: FAMILY MEDICINE

## 2025-05-05 PROCEDURE — 3066F NEPHROPATHY DOC TX: CPT | Mod: ,,, | Performed by: FAMILY MEDICINE

## 2025-05-05 PROCEDURE — 3288F FALL RISK ASSESSMENT DOCD: CPT | Mod: ,,, | Performed by: FAMILY MEDICINE

## 2025-05-05 PROCEDURE — 1101F PT FALLS ASSESS-DOCD LE1/YR: CPT | Mod: ,,, | Performed by: FAMILY MEDICINE

## 2025-05-05 PROCEDURE — 3079F DIAST BP 80-89 MM HG: CPT | Mod: ,,, | Performed by: FAMILY MEDICINE

## 2025-05-05 PROCEDURE — 1159F MED LIST DOCD IN RCRD: CPT | Mod: ,,, | Performed by: FAMILY MEDICINE

## 2025-05-05 RX ORDER — SPIRONOLACTONE 25 MG/1
25 TABLET ORAL DAILY
Qty: 60 TABLET | Refills: 1 | Status: SHIPPED | OUTPATIENT
Start: 2025-05-05 | End: 2026-05-05

## 2025-05-05 RX ORDER — ACETAMINOPHEN 500 MG
TABLET ORAL
Qty: 1 EACH | Refills: 0 | Status: SHIPPED | OUTPATIENT
Start: 2025-05-05

## 2025-05-05 NOTE — PROGRESS NOTES
Clinic Note    Patient Name: Wally Lee  : 1950  MRN: [id number]    Chief Complaint   Patient presents with    Follow-up     1 month f/u       HPI:    Mr. Wally Lee is a 74 y.o. male who presents to clinic today with CC of follow up on chronic disease processes including HTN.  BP elevated today. BP elevated at previous visit. Hydralazine dose increased previously. He is also on amlodipine. Previously prescribed losartan but patient discontinued stating it caused dizziness.   He is also following with Cardiology (Dr. Vaughan).   Patient is asymptomatic from elevated readings.   Patient reports chronic issues are well controlled on current medication regimen.  Denies problems or side effects with medications.  Patient is, otherwise, without complaints.     Medications:  Medication List with Changes/Refills   New Medications    BLOOD PRESSURE MONITOR KIT    For home blood pressure monitoring - Dx: HTN I10    SPIRONOLACTONE (ALDACTONE) 25 MG TABLET    Take 1 tablet (25 mg total) by mouth once daily.   Current Medications    AMLODIPINE (NORVASC) 10 MG TABLET    Take 1 tablet (10 mg total) by mouth once daily.    HYDRALAZINE (APRESOLINE) 50 MG TABLET    Take 1 tablet (50 mg total) by mouth 3 (three) times daily.        Allergies: Patient has no known allergies.      Past Medical History:    History reviewed. No pertinent past medical history.    Past Surgical History:    History reviewed. No pertinent surgical history.      Social History:    Tobacco Use History[1]  Social History     Substance and Sexual Activity   Alcohol Use Yes    Comment: drinks 1 pint a day     Social History     Substance and Sexual Activity   Drug Use Yes    Frequency: 1.0 times per week    Types: Marijuana    Comment: every other week         Family History:    Family History   Problem Relation Name Age of Onset    Hypertension Mother      Hypertension Father      Hypertension Sister      Hypertension Brother         Review of  Systems:    Review of Systems   Constitutional:  Negative for appetite change, chills, fatigue, fever and unexpected weight change.   Eyes:  Negative for visual disturbance.   Respiratory:  Negative for cough.         Reports chronic SOB with exertion - at baseline   Cardiovascular:  Negative for chest pain.        Reports occasional swelling in legs if patient is on his feet all day; resolves with elevation   Gastrointestinal:  Negative for abdominal pain, change in bowel habit, constipation, diarrhea, nausea and vomiting.   Musculoskeletal:  Negative for arthralgias.   Integumentary:  Negative for rash.   Neurological:  Negative for dizziness and headaches.   Psychiatric/Behavioral:  The patient is not nervous/anxious.         Vitals:    Vitals:    05/05/25 1457 05/05/25 1532   BP: (!) 212/102 (!) 178/82   BP Location: Left arm Left arm   Patient Position: Sitting Sitting   Pulse: 79    Resp: 18    Temp: 98.2 °F (36.8 °C)    TempSrc: Oral    SpO2: 96%    Weight: 91.6 kg (202 lb)    Height: 6' (1.829 m)        Body mass index is 27.4 kg/m².    Wt Readings from Last 3 Encounters:   05/05/25 1457 91.6 kg (202 lb)   03/31/25 1423 93.9 kg (207 lb)   03/26/25 1408 90.7 kg (200 lb)        Physical Exam:    Physical Exam  Constitutional:       General: He is not in acute distress.     Appearance: Normal appearance.   HENT:      Nose: Nose normal.      Mouth/Throat:      Mouth: Mucous membranes are moist.      Pharynx: Oropharynx is clear.   Eyes:      Conjunctiva/sclera: Conjunctivae normal.   Cardiovascular:      Rate and Rhythm: Normal rate and regular rhythm.      Heart sounds: Normal heart sounds. No murmur heard.  Pulmonary:      Effort: Pulmonary effort is normal. No respiratory distress.      Breath sounds: Normal breath sounds. No wheezing, rhonchi or rales.   Abdominal:      General: Bowel sounds are normal.      Palpations: Abdomen is soft.      Tenderness: There is no abdominal tenderness.   Musculoskeletal:       Cervical back: Neck supple.      Comments: Trace edema bilateral ankles   Skin:     Findings: No rash.   Neurological:      General: No focal deficit present.      Mental Status: He is alert. Mental status is at baseline.   Psychiatric:         Mood and Affect: Mood normal.           Assessment/Plan:   1. Essential hypertension  -     spironolactone (ALDACTONE) 25 MG tablet; Take 1 tablet (25 mg total) by mouth once daily.  Dispense: 60 tablet; Refill: 1- new medication. Risks/benefits/potential side effects/black box warning reviewed and discussed with patient.   -     blood pressure monitor Kit; For home blood pressure monitoring - Dx: HTN I10  Dispense: 1 each; Refill: 0  -     Comprehensive Metabolic Panel; Future; Expected date: 05/05/2025         Active Problem List with Overview Notes    Diagnosis Date Noted    Essential hypertension 02/05/2025        Health Maintenance:  Health Maintenance   Topic Date Due    Hepatitis C Screening  Never done    TETANUS VACCINE  Never done    Pneumococcal Vaccines (Age 50+) (1 of 2 - PCV) Never done    Colorectal Cancer Screening  Never done    Shingles Vaccine (1 of 2) Never done    RSV Vaccine (Age 60+ and Pregnant patients) (1 - Risk 60-74 years 1-dose series) Never done    Abdominal Aortic Aneurysm Screening  Never done    COVID-19 Vaccine (5 - 2024-25 season) 09/01/2024    Influenza Vaccine (Season Ended) 09/01/2025    Lipid Panel  02/05/2030       RTC in 1 month for follow up on HTN medication changes  RTC sooner if needed.   Patient voiced understanding and is agreeable to plan.      Naima Gallagher MD    Family Medicine           [1]   Social History  Tobacco Use   Smoking Status Every Day    Types: Cigarettes   Smokeless Tobacco Never